# Patient Record
Sex: FEMALE | Race: WHITE | NOT HISPANIC OR LATINO | Employment: FULL TIME | URBAN - METROPOLITAN AREA
[De-identification: names, ages, dates, MRNs, and addresses within clinical notes are randomized per-mention and may not be internally consistent; named-entity substitution may affect disease eponyms.]

---
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
## 2019-12-16 ENCOUNTER — THERAPY VISIT (OUTPATIENT)
Dept: PHYSICAL THERAPY | Facility: CLINIC | Age: 19
End: 2019-12-16
Payer: COMMERCIAL

## 2019-12-16 DIAGNOSIS — M54.50 CHRONIC BILATERAL LOW BACK PAIN WITHOUT SCIATICA: ICD-10-CM

## 2019-12-16 DIAGNOSIS — G89.29 CHRONIC BILATERAL THORACIC BACK PAIN: ICD-10-CM

## 2019-12-16 DIAGNOSIS — G89.29 CHRONIC BILATERAL LOW BACK PAIN WITHOUT SCIATICA: ICD-10-CM

## 2019-12-16 DIAGNOSIS — M54.6 CHRONIC BILATERAL THORACIC BACK PAIN: ICD-10-CM

## 2019-12-16 PROCEDURE — 97112 NEUROMUSCULAR REEDUCATION: CPT | Mod: GP | Performed by: PHYSICAL THERAPIST

## 2019-12-16 PROCEDURE — 97161 PT EVAL LOW COMPLEX 20 MIN: CPT | Mod: GP | Performed by: PHYSICAL THERAPIST

## 2019-12-16 PROCEDURE — 97110 THERAPEUTIC EXERCISES: CPT | Mod: GP | Performed by: PHYSICAL THERAPIST

## 2019-12-16 NOTE — PROGRESS NOTES
[facility] Initial Evaluation -- Lumbar    Date: December 16, 2019  Prince Zimmerman is a 19 year old female with a LBP condition.   Referral: Ortho  Work mechanical stresses:  Lifting, flexed position a lot  Employment status:  Childcare provider - full-time  Leisure mechanical stresses: didn't assess  Functional disability score (JOE/STarT Back):  See Epic  VAS score (0-10): 7/10  Patient goals/expectations:  To try to decrease back pain    HISTORY:    Present symptoms: B low back pain into B lateral hips (R>L hip)  Pain quality (sharp/shooting/stabbing/aching/burning/cramping):  Sharp/tightness pain at low back, aching pain into B hips (R>L)   Paresthesia (yes/no):  A couple episodes of numbness into B LE's over the past 6 years - nothing recent.    Present since (onset date): MD order written 12/11/2019 (chronic LBP)  Symptoms (improving/unchanging/worsening):  unchanging.     Symptoms commenced as a result of: insiduous  Condition occurred in the following environment:   n/a    Symptoms at onset (back/thigh/leg): B low back pain into B lateral hips  Constant symptoms (back/thigh/leg): n/a  Intermittent symptoms (back/thigh/leg): B low back pain into B lateral hips    Symptoms are made worse with the following: Sometimes Sitting, Sometimes Standing, Always Lying and Time of day - Always PM   Symptoms are made better with the following: Always Walking and Time of day - Always AM    Disturbed sleep (yes/no):  No (just hard to get comfortable) Sleeping postures (prone/sup/side R/L): L sidelying w/pillow between knees    Previous episodes (0/1-5/6-10/11+): relatively constant over the past 4-6 years Year of first episode: 2013    Previous history: B low back pain/B hip pain  Previous treatments: pt has seen a chiropractor for 2-3 years in the past, then took a break and then restarted for the past 2 years      Specific Questions:  Cough/Sneeze/Strain (pos/neg): neg  Bowel/Bladder  (normal/abnormal): neg  Gait (normal/abnormal): normal  Medications (nil/NSAIDS/analg/steroids/anticoag/other):  None, ibuprofen if back pain is really bad  Medical allergies:  See Epic  General health (excellent/good/fair/poor):  good  Pertinent medical history:  None  Imaging (None/Xray/MRI/Other):  X-ray - mild arthritis  Recent or major surgery (yes/no):  no  Night pain (yes/no): no  Accidents (yes/no): no  Unexplained weight loss (yes/no): no  Barriers at home: no  Other red flags: no    EXAMINATION    Posture:   Sitting (good/fair/poor): poor  Standing (good/fair/poor):good  Lordosis (red/acc/normal): normal  Correction of posture (better/worse/no effect): slightly worse in low back - no sx into B hips    Lateral Shift (right/left/nil): nil  Relevant (yes/no):  n/a  Other Observations: n/a    Neurological:    Motor deficit:  Normal myotomes  Reflexes:  Not assessed  Sensory deficit:  Not assessed  Dural signs:  (-) SLR w/DF, B LE    Movement Loss:   Oskar Mod Min Nil Pain   Flexion    X stiffness   Extension X    ++   Side Gliding R   X  (+ on R low back)   Side Gliding L   X       Test Movements:   During: produces, abolishes, increases, decreases, no effect, centralizing, peripheralizing   After: better, worse, no better, no worse, no effect, centralized, peripheralized    Pretest symptoms lyin/10 LBP   Symptoms During Symptoms After ROM increased ROM decreased No Effect   MOLINA Increases    No Worse         Rep MOLINA Increases    Worse      X   EIL Increases    Worse         Rep EIL Increases    Worse      X       Static Tests: not assessed    Other Tests: not assessed    Provisional Classification:  Inconclusive/Other - Mechanically Inconclusive    Principle of Management:  Education:  Postural education and keeping neutral spine for lifting/reaching activities  Equipment provided:  none  Mechanical therapy (Y/N):  y   Other:  Core/functionall strengthening program    ASSESSMENT/PLAN:    Patient is a 19 year  old female with lumbar complaints.    Patient has the following significant findings with corresponding treatment plan.                Diagnosis 1:  LBP  Pain -  hot/cold therapy  Decreased strength - therapeutic exercise, therapeutic activities and home program  Impaired muscle performance - neuro re-education and home program  Decreased function - therapeutic activities and home program    Therapy Evaluation Codes:   1) History comprised of:   Personal factors that impact the plan of care:      Time since onset of symptoms and Work status.    Comorbidity factors that impact the plan of care are:      None.     Medications impacting care: None.  2) Examination of Body Systems comprised of:   Body structures and functions that impact the plan of care:      Lumbar spine.   Activity limitations that impact the plan of care are:      Lifting, Sitting and Laying down.  3) Clinical presentation characteristics are:   Stable/Uncomplicated.  4) Decision-Making    Low complexity using standardized patient assessment instrument and/or measureable assessment of functional outcome.  Cumulative Therapy Evaluation is: Low complexity.    Previous and current functional limitations:  (See Goal Flow Sheet for this information)    Short term and Long term goals: (See Goal Flow Sheet for this information)     Communication ability:  Patient appears to be able to clearly communicate and understand verbal and written communication and follow directions correctly.  Treatment Explanation - The following has been discussed with the patient:   RX ordered/plan of care  Anticipated outcomes  Possible risks and side effects  This patient would benefit from PT intervention to resume normal activities.   Rehab potential is good.    Frequency:  1 X week, once daily  Duration:  for 6 weeks  Discharge Plan:  Achieve all LTG.  Independent in home treatment program.  Reach maximal therapeutic benefit.    Please refer to the daily flowsheet for  treatment today, total treatment time and time spent performing 1:1 timed codes.

## 2019-12-31 ENCOUNTER — THERAPY VISIT (OUTPATIENT)
Dept: PHYSICAL THERAPY | Facility: CLINIC | Age: 19
End: 2019-12-31
Payer: COMMERCIAL

## 2019-12-31 DIAGNOSIS — M54.6 CHRONIC BILATERAL THORACIC BACK PAIN: ICD-10-CM

## 2019-12-31 DIAGNOSIS — G89.29 CHRONIC BILATERAL THORACIC BACK PAIN: ICD-10-CM

## 2019-12-31 DIAGNOSIS — M54.50 CHRONIC BILATERAL LOW BACK PAIN WITHOUT SCIATICA: ICD-10-CM

## 2019-12-31 DIAGNOSIS — G89.29 CHRONIC BILATERAL LOW BACK PAIN WITHOUT SCIATICA: ICD-10-CM

## 2019-12-31 PROCEDURE — 97112 NEUROMUSCULAR REEDUCATION: CPT | Mod: GP | Performed by: PHYSICAL THERAPIST

## 2019-12-31 PROCEDURE — 97110 THERAPEUTIC EXERCISES: CPT | Mod: GP | Performed by: PHYSICAL THERAPIST

## 2020-01-06 ENCOUNTER — THERAPY VISIT (OUTPATIENT)
Dept: PHYSICAL THERAPY | Facility: CLINIC | Age: 20
End: 2020-01-06
Payer: COMMERCIAL

## 2020-01-06 DIAGNOSIS — M54.6 CHRONIC BILATERAL THORACIC BACK PAIN: ICD-10-CM

## 2020-01-06 DIAGNOSIS — G89.29 CHRONIC BILATERAL THORACIC BACK PAIN: ICD-10-CM

## 2020-01-06 DIAGNOSIS — M54.50 CHRONIC BILATERAL LOW BACK PAIN WITHOUT SCIATICA: ICD-10-CM

## 2020-01-06 DIAGNOSIS — G89.29 CHRONIC BILATERAL LOW BACK PAIN WITHOUT SCIATICA: ICD-10-CM

## 2020-01-06 PROCEDURE — 97112 NEUROMUSCULAR REEDUCATION: CPT | Mod: GP | Performed by: PHYSICAL THERAPIST

## 2020-01-06 PROCEDURE — 97110 THERAPEUTIC EXERCISES: CPT | Mod: GP | Performed by: PHYSICAL THERAPIST

## 2020-01-29 ENCOUNTER — THERAPY VISIT (OUTPATIENT)
Dept: PHYSICAL THERAPY | Facility: CLINIC | Age: 20
End: 2020-01-29
Payer: COMMERCIAL

## 2020-01-29 DIAGNOSIS — M54.50 CHRONIC BILATERAL LOW BACK PAIN WITHOUT SCIATICA: ICD-10-CM

## 2020-01-29 DIAGNOSIS — G89.29 CHRONIC BILATERAL LOW BACK PAIN WITHOUT SCIATICA: ICD-10-CM

## 2020-01-29 DIAGNOSIS — M54.6 CHRONIC BILATERAL THORACIC BACK PAIN: ICD-10-CM

## 2020-01-29 DIAGNOSIS — G89.29 CHRONIC BILATERAL THORACIC BACK PAIN: ICD-10-CM

## 2020-01-29 PROCEDURE — 97112 NEUROMUSCULAR REEDUCATION: CPT | Mod: GP | Performed by: PHYSICAL THERAPIST

## 2020-01-29 PROCEDURE — 97110 THERAPEUTIC EXERCISES: CPT | Mod: GP | Performed by: PHYSICAL THERAPIST

## 2020-01-29 NOTE — LETTER
[facility]  [address] [place] [address]  [phone]    2020    Re: Prince Zimmerman   :   2000  MRN:  [id number]   REFERRING PHYSICIAN:   Mireille Shahid    [facility] PT    Date of Initial Evaluation:  19  Visits:  Rxs Used: 4  Reason for Referral:     Chronic bilateral low back pain without sciatica  Chronic bilateral thoracic back pain    DISCHARGE REPORT  Progress reporting period is from 2019 to 2020.     SUBJECTIVE  Subjective changes noted by patient:  Pt reports that sitting for prolonged periods continues to bother her, but all other activities are pain-free.  HEP is going well.    Changes in function:  Yes (See Goal flowsheet attached for changes in current functional level)  Adverse reaction to treatment or activity: activity - prolonged sitting.  OBJECTIVE  Changes noted in objective findings:  Yes, see below.  Objective: lumbar AROM flex WNL, B SG WNL, ext min loss (+).   ASSESSMENT/PLAN  STG/LTGs have been met or progress has been made towards goals:  Yes (See Goal flow sheet completed today.)  Assessment of Progress: The patient has met all of their long term goals.  Self Management Plans:  Patient is independent in a home treatment program.  I have re-evaluated this patient and find that the nature, scope, duration and intensity of the therapy is appropriate for the medical condition of the patient.  Prince continues to require the following intervention to meet STG and LTG's:  PT intervention is no longer required to meet STG/LTG.  Recommendations:  This patient is ready to be discharged from therapy and continue their home treatment program.        Thank you for your referral.    INQUIRIES  Therapist: Ashanti Lara DPT, Cert MDT  [facility]  [address]  [place] [address]  Phone: [phone]  Fax: [phone]

## 2020-01-29 NOTE — PROGRESS NOTES
Subjective:  HPI  Oswestry Score: 20 %                 Objective:  System    Physical Exam    General     ROS    Assessment/Plan:    DISCHARGE REPORT    Progress reporting period is from 12/16/2019 to 1/29/2020.       SUBJECTIVE  Subjective changes noted by patient:  Pt reports that sitting for prolonged periods continues to bother her, but all other activities are pain-free.  HEP is going well.    Changes in function:  Yes (See Goal flowsheet attached for changes in current functional level)  Adverse reaction to treatment or activity: activity - prolonged sitting.    OBJECTIVE  Changes noted in objective findings:  Yes, see below.  Objective: lumbar AROM flex WNL, B SG WNL, ext min loss (+).     ASSESSMENT/PLAN  STG/LTGs have been met or progress has been made towards goals:  Yes (See Goal flow sheet completed today.)  Assessment of Progress: The patient has met all of their long term goals.  Self Management Plans:  Patient is independent in a home treatment program.  I have re-evaluated this patient and find that the nature, scope, duration and intensity of the therapy is appropriate for the medical condition of the patient.  Prince continues to require the following intervention to meet STG and LTG's:  PT intervention is no longer required to meet STG/LTG.    Recommendations:  This patient is ready to be discharged from therapy and continue their home treatment program.    Please refer to the daily flowsheet for treatment today, total treatment time and time spent performing 1:1 timed codes.

## 2022-03-11 ENCOUNTER — HOSPITAL ENCOUNTER (EMERGENCY)
Facility: CLINIC | Age: 22
Discharge: HOME | End: 2022-03-11
Attending: PHYSICIAN ASSISTANT | Admitting: PHYSICIAN ASSISTANT
Payer: COMMERCIAL

## 2022-03-11 VITALS
OXYGEN SATURATION: 99 % | TEMPERATURE: 99.4 F | SYSTOLIC BLOOD PRESSURE: 137 MMHG | BODY MASS INDEX: 35.12 KG/M2 | WEIGHT: 204.59 LBS | DIASTOLIC BLOOD PRESSURE: 88 MMHG | RESPIRATION RATE: 16 BRPM | HEART RATE: 108 BPM

## 2022-03-11 DIAGNOSIS — J02.9 PHARYNGITIS: ICD-10-CM

## 2022-03-11 DIAGNOSIS — H66.91 RIGHT ACUTE OTITIS MEDIA: ICD-10-CM

## 2022-03-11 LAB
DEPRECATED S PYO AG THROAT QL EIA: NEGATIVE
FLUAV RNA SPEC QL NAA+PROBE: NEGATIVE
FLUBV RNA RESP QL NAA+PROBE: NEGATIVE
SARS-COV-2 RNA RESP QL NAA+PROBE: NEGATIVE

## 2022-03-11 PROCEDURE — 87651 STREP A DNA AMP PROBE: CPT | Performed by: PHYSICIAN ASSISTANT

## 2022-03-11 PROCEDURE — 99283 EMERGENCY DEPT VISIT LOW MDM: CPT

## 2022-03-11 PROCEDURE — C9803 HOPD COVID-19 SPEC COLLECT: HCPCS

## 2022-03-11 PROCEDURE — 87636 SARSCOV2 & INF A&B AMP PRB: CPT | Performed by: PHYSICIAN ASSISTANT

## 2022-03-11 RX ORDER — CEFDINIR 300 MG/1
300 CAPSULE ORAL 2 TIMES DAILY
Qty: 14 CAPSULE | Refills: 0 | Status: SHIPPED | OUTPATIENT
Start: 2022-03-11 | End: 2022-03-18

## 2022-03-11 ASSESSMENT — ENCOUNTER SYMPTOMS
COUGH: 0
DIZZINESS: 1
RHINORRHEA: 0
FEVER: 0
SORE THROAT: 1

## 2022-03-12 LAB — GROUP A STREP BY PCR: NOT DETECTED

## 2022-03-12 NOTE — ED TRIAGE NOTES
A&Ox4. ABC's intact. Pt c/o throat and ear pain. States this morning she was dizzy and thought it was morning sickness.  Pt states she is 4 weeks pregnant.  Denies N/V.    Pain 9/10 at this time.

## 2022-03-12 NOTE — DISCHARGE INSTRUCTIONS
"Discharge Instructions  Otitis Media  You or your child have an ear infection known as otitis media or middle ear infection (otitis = ear, media = middle). These infections often develop after a viral infection, such as a cold. The cold causes swelling around the pressure-equalizing tube of the ear, which allows fluid to build up in the space behind the eardrum (the middle ear). This fluid build-up can trap bacteria and viruses and increase pressure on the eardrum causing pain. Symptoms of an ear infection can include earache/pain and decreased hearing loss. These symptoms often come on suddenly. For children, symptoms may include fever (temperature >100.4 F), pulling on the ear, fussiness, and decreased activity/appetite.  Generally, every Emergency Department visit should have a follow-up clinic visit with either a primary or a specialty clinic/provider. Please follow-up as instructed by your emergency provider today.    Return to the Emergency Department if:  Your child becomes very fussy or weak.  The symptoms get worse, or if you develop a severe headache, stiff neck, or new symptoms.    Treatment:  The \"best\" treatment depends on your age, history of previous infections, and any underlying medical problems.  Antibiotics are not given to every patient with an ear infection because studies show that many people with ear infections will improve without using antibiotics. Because antibiotics can have side effects such as diarrhea and stomach upset and can also cause severe allergic reactions, providers are trying to avoid using antibiotics if it is safe for the patient to do so.   In these cases, a prescription for antibiotics may be given to be filled in 24 -48 hours if symptoms are getting worse or not improving (this is often called  wait and see  treatment). If the symptoms are improving, the antibiotic does not need to be taken.   Remember, antibiotics do not treat pain.    Pain medications. You may take a " pain medication such as Tylenol  (acetaminophen), Advil  (ibuprofen), Nuprin  (ibuprofen) or Aleve  (naproxen).    Complications:    Tympanic membrane rupture - One possible complication of an ear infection is rupture of the tympanic membrane, or ear drum. This happens because of pressure on the tympanic membrane from the infected fluid. When the tympanic membrane ruptures, you may have pus or blood drain from the ear. It does not hurt when the membrane ruptures, and many people actually feel better because pressure is released. Fortunately, the tympanic membrane usually heals quickly after rupturing, within hours to days. You should keep water out of the ear until you re-check with your provider to be sure the ear drum has healed.     Mastoiditis - Rarely, the area behind the ear can become infected, this area is called the mastoid.  If you notice redness and swelling behind your ear, see your provider or return to the Emergency Department immediately.      Hearing loss - The fluid that collects behind the eardrum (called an effusion) can persist for weeks to months after the pain of an ear infection resolves. An effusion causes trouble hearing, which is usually temporary. If the fluid persists, however, it can interfere with the process of learning to speak.   For this reason, children under 2 need to be seen by their pediatrician WITHIN 3 MONTHS to ensure that the fluid has resolved.  If you were given a prescription for medicine here today, be sure to read all of the information (including the package insert) that comes with your prescription.  This will include important information about the medicine, its side effects, and any warnings that you need to know about.  The pharmacist who fills the prescription can provide more information and answer questions you may have about the medicine.  If you have questions or concerns that the pharmacist cannot address, please call or return to the Emergency Department.    Remember that you can always come back to the Emergency Department if you are not able to see your regular provider in the amount of time listed above, if you get any new symptoms, or if there is anything that worries you.  Discharge Instructions  Sore Throat  You were seen today for a sore throat.  Most (>80%) sore throats are caused by a virus. Antibiotics do not help with viral infections, but you can fight off the virus on your own.  In this case, your sore throat would be treated with medications for your pain and fever.    Strep throat is a kind of sore throat caused by Group A streptococcus bacteria.  This type of sore throat is treated with antibiotics.  If you had a rapid test done today for strep throat and it did not show infection, a culture is done in some cases. The culture can take several days to complete. If the culture shows you have strep throat, we will call you and get you a prescription for antibiotics. We will not contact you with a negative culture result.  Generally, every Emergency Department visit should have a follow-up clinic visit with either a primary or a specialty clinic/provider. Please follow-up as instructed by your emergency provider today.  Return to the Emergency Department if:  If you have difficulty breathing.  If you are drooling because you are unable to swallow.  You become dehydrated due to difficulty drinking. Signs of dehydration include weakness, dry mouth, and urinating less than 3 times per day.  If you develop swelling of the neck or tongue.  If you develop a high fever with either severe or unusual headache or stiff neck.    Treatment:    Pain relief -- Non-prescription pain medications, such as Tylenol  (acetaminophen) or Motrin , Advil  (ibuprofen) are usually recommended for pain.  Do not use a medicine that you are allergic to, or if your provider has told you not to use it.  Soft or liquid diet. Concentrate on liquids to keep yourself hydrated. Cold liquids  (popsicles, ice cream, etc.) may feel good on your throat.  If you were given a prescription for medicine here today, be sure to read all of the information (including the package insert) that comes with your prescription.  This will include important information about the medicine, its side effects, and any warnings that you need to know about.  The pharmacist who fills the prescription can provide more information and answer questions you may have about the medicine.  If you have questions or concerns that the pharmacist cannot address, please call or return to the Emergency Department.   Remember that you can always come back to the Emergency Department if you are not able to see your regular provider in the amount of time listed above, if you get any new symptoms, or if there is anything that worries you.

## 2022-03-12 NOTE — ED PROVIDER NOTES
History   Chief Complaint:  Pharyngitis       HPI   Prince Zimmerman is a 21 year old female, currently 4 week pregnant, who presents with sore throat and right ear pain that started yesterday. She also mentions that she felt dizzy. She has tried Tylenol earlier today with no relief. She denies fevers, cough, or runny nose. No known sick contacts. No chest pain, sob, abdominal pain, or vaginal bleeding.  Reports that she had Covid-19 in January 2022.     Review of Systems   Constitutional: Negative for fever.   HENT: Positive for ear pain and sore throat. Negative for rhinorrhea.    Respiratory: Negative for cough.    Neurological: Positive for dizziness.   All other systems reviewed and are negative.    Allergies:  Amoxicillin  Azithromycin    Medications:  Nuvaring  Lexapro    Past Medical History:     Anxiety  Hemangioma    Past Surgical History:    Tympanostomy tube placement  Tonsil and adenoidectomy    Social History:  The patient presents to the ED alone     Physical Exam     Patient Vitals for the past 24 hrs:   BP Temp Temp src Pulse Resp SpO2 Weight   03/11/22 2201 -- -- -- 108 -- 99 % --   03/11/22 2156 -- -- -- 113 -- 98 % --   03/11/22 2136 -- -- -- 115 -- -- --   03/11/22 2123 137/88 99.4  F (37.4  C) Temporal (!) 121 16 100 % 92.8 kg (204 lb 9.4 oz)       Physical Exam  General: Awake, alert, pleasant, non-toxic.  Head:  Scalp is NC/AT  Eyes:  Conjunctiva normal, PERRL  ENT:  The external nose and ears are normal.     The oropharynx is red with 1+ BL symmetric tonsillar swelling. Uvula midline, no submandibular swelling, sublingual swelling, trismus.  Right TM red with opacity, no perforation, left TM normal.  no mastoid swelling or tenderness.  External canals normal, no tragal ttp. Pinna and helical structures normal.  Neck:  Normal range of motion without rigidity.  CV:  Mildly tachycardic but regular.    No pathologic murmur, rubs, or gallops.  Resp:  Breath sounds are clear  bilaterally    Non-labored, no retractions or accessory muscle use  Abdomen: Abdomen is soft, no distension, no tenderness, no masses.  MS:  No lower extremity edema or asymmetric calf swelling.   Skin:  Warm and dry, No rash or lesions noted.  Neuro: Alert and oriented x3.  /5 strength BL in UE and LE, normal sensation to touch.  Cranial nerves 2-12 intact.  Normal finger to nose testing.  Gait normal  Psych: Awake. Alert. Normal affect. Appropriate interactions.    Emergency Department Course     Laboratory:  Labs Ordered and Resulted from Time of ED Arrival to Time of ED Departure   INFLUENZA A/B & SARS-COV2 PCR MULTIPLEX - Normal       Result Value    Influenza A PCR Negative      Influenza B PCR Negative      SARS CoV2 PCR Negative     STREPTOCOCCUS A RAPID SCREEN W REFELX TO PCR - Normal    Group A Strep antigen Negative     GROUP A STREPTOCOCCUS PCR THROAT SWAB        Emergency Department Course:  Reviewed:  I reviewed nursing notes, vitals, past medical history and Care Everywhere    Assessments:   I obtained history and examined the patient as noted above.      I rechecked the patient and explained findings.     Disposition:  The patient was discharged to home.     Impression & Plan     Medical Decision Makin-year-old female presents with sore throat and right ear pain.  Examination reveals right acute otitis media.  Also has associated pharyngitis.  No evidence of mastoiditis, meningitis, otitis externa, RPA, PTA, Baldo's angina, epiglottitis, Lemierre's syndrome.  She has low-grade fever here with very slight tachycardia but is not septic or ill-appearing.  No evidence of other serious bacterial infection.  COVID flu and strep negative.  Plan will be Omnicef given nonanaphylactic penicillin allergy.  Close follow-up with PCP.  Return for new worsening or changing symptoms.    Diagnosis:    ICD-10-CM    1. Right acute otitis media  H66.91    2. Pharyngitis  J02.9        Discharge  Medications:  New Prescriptions    CEFDINIR (OMNICEF) 300 MG CAPSULE    Take 1 capsule (300 mg) by mouth 2 times daily for 7 days     Scribe Disclosure:  I, Pavel Canas, am serving as a scribe at 9:27 PM on 3/11/2022 to document services personally performed by Gary Gaspar PA-C based on my observations and the provider's statements to me.            Gary Gaspar PA-C  03/11/22 [id number]

## 2023-12-19 ENCOUNTER — LAB REQUISITION (OUTPATIENT)
Dept: LAB | Facility: CLINIC | Age: 23
End: 2023-12-19

## 2023-12-19 DIAGNOSIS — R53.83 OTHER FATIGUE: ICD-10-CM

## 2023-12-19 DIAGNOSIS — N92.6 IRREGULAR MENSTRUATION, UNSPECIFIED: ICD-10-CM

## 2023-12-19 LAB
ALBUMIN SERPL BCG-MCNC: 4.6 G/DL (ref 3.5–5.2)
ALP SERPL-CCNC: 53 U/L (ref 40–150)
ALT SERPL W P-5'-P-CCNC: 21 U/L (ref 0–50)
ANION GAP SERPL CALCULATED.3IONS-SCNC: 11 MMOL/L (ref 7–15)
AST SERPL W P-5'-P-CCNC: 18 U/L (ref 0–45)
BILIRUB SERPL-MCNC: 0.3 MG/DL
BUN SERPL-MCNC: 12.8 MG/DL (ref 6–20)
CALCIUM SERPL-MCNC: 9.6 MG/DL (ref 8.6–10)
CHLORIDE SERPL-SCNC: 104 MMOL/L (ref 98–107)
CREAT SERPL-MCNC: 0.69 MG/DL (ref 0.51–0.95)
DEPRECATED HCO3 PLAS-SCNC: 22 MMOL/L (ref 22–29)
EGFRCR SERPLBLD CKD-EPI 2021: >90 ML/MIN/1.73M2
ERYTHROCYTE [DISTWIDTH] IN BLOOD BY AUTOMATED COUNT: 11.9 % (ref 10–15)
GLUCOSE SERPL-MCNC: 107 MG/DL (ref 70–99)
HCT VFR BLD AUTO: 44.3 % (ref 35–47)
HGB BLD-MCNC: 14.8 G/DL (ref 11.7–15.7)
HOLD SPECIMEN: NORMAL
MCH RBC QN AUTO: 30.6 PG (ref 26.5–33)
MCHC RBC AUTO-ENTMCNC: 33.4 G/DL (ref 31.5–36.5)
MCV RBC AUTO: 92 FL (ref 78–100)
PLATELET # BLD AUTO: 262 10E3/UL (ref 150–450)
POTASSIUM SERPL-SCNC: 3.8 MMOL/L (ref 3.4–5.3)
PROLACTIN SERPL 3RD IS-MCNC: 14 NG/ML (ref 5–23)
PROT SERPL-MCNC: 7.8 G/DL (ref 6.4–8.3)
RBC # BLD AUTO: 4.84 10E6/UL (ref 3.8–5.2)
SODIUM SERPL-SCNC: 137 MMOL/L (ref 135–145)
TSH SERPL DL<=0.005 MIU/L-ACNC: 0.97 UIU/ML (ref 0.3–4.2)
WBC # BLD AUTO: 7.9 10E3/UL (ref 4–11)

## 2023-12-19 PROCEDURE — 80053 COMPREHEN METABOLIC PANEL: CPT | Performed by: OBSTETRICS & GYNECOLOGY

## 2023-12-19 PROCEDURE — 84443 ASSAY THYROID STIM HORMONE: CPT | Performed by: OBSTETRICS & GYNECOLOGY

## 2023-12-19 PROCEDURE — 84146 ASSAY OF PROLACTIN: CPT | Performed by: OBSTETRICS & GYNECOLOGY

## 2023-12-19 PROCEDURE — 85027 COMPLETE CBC AUTOMATED: CPT | Performed by: OBSTETRICS & GYNECOLOGY

## 2024-03-14 ENCOUNTER — LAB REQUISITION (OUTPATIENT)
Dept: LAB | Facility: CLINIC | Age: 24
End: 2024-03-14

## 2024-03-14 DIAGNOSIS — N84.0 POLYP OF CORPUS UTERI: ICD-10-CM

## 2024-03-14 PROCEDURE — 88305 TISSUE EXAM BY PATHOLOGIST: CPT | Performed by: STUDENT IN AN ORGANIZED HEALTH CARE EDUCATION/TRAINING PROGRAM

## 2024-03-18 LAB
PATH REPORT.COMMENTS IMP SPEC: NORMAL
PATH REPORT.COMMENTS IMP SPEC: NORMAL
PATH REPORT.FINAL DX SPEC: NORMAL
PATH REPORT.GROSS SPEC: NORMAL
PATH REPORT.MICROSCOPIC SPEC OTHER STN: NORMAL
PATH REPORT.RELEVANT HX SPEC: NORMAL
PHOTO IMAGE: NORMAL

## 2025-02-01 ENCOUNTER — HEALTH MAINTENANCE LETTER (OUTPATIENT)
Age: 25
End: 2025-02-01